# Patient Record
Sex: FEMALE | Race: WHITE | URBAN - METROPOLITAN AREA
[De-identification: names, ages, dates, MRNs, and addresses within clinical notes are randomized per-mention and may not be internally consistent; named-entity substitution may affect disease eponyms.]

---

## 2021-05-04 ENCOUNTER — NEW PATIENT ROUTINE/VISION (OUTPATIENT)
Dept: URBAN - METROPOLITAN AREA CLINIC 49 | Facility: CLINIC | Age: 51
End: 2021-05-04

## 2021-05-04 DIAGNOSIS — H52.4: ICD-10-CM

## 2021-05-04 DIAGNOSIS — H35.361: ICD-10-CM

## 2021-05-04 DIAGNOSIS — H25.13: ICD-10-CM

## 2021-05-04 DIAGNOSIS — Z01.00: ICD-10-CM

## 2021-05-04 PROCEDURE — 92015 DETERMINE REFRACTIVE STATE: CPT

## 2021-05-04 PROCEDURE — 92004 COMPRE OPH EXAM NEW PT 1/>: CPT

## 2021-05-04 ASSESSMENT — TONOMETRY
OD_IOP_MMHG: 13
OS_IOP_MMHG: 14

## 2021-05-04 ASSESSMENT — VISUAL ACUITY
OU_CC: J1+
OD_GLARE: 20/80
OS_CC: 20/30
OD_CC: 20/20
OD_GLARE: 20/50
OS_GLARE: >20/400

## 2021-05-04 ASSESSMENT — KERATOMETRY
OS_AXISANGLE2_DEGREES: 160
OD_AXISANGLE2_DEGREES: 120
OS_K1POWER_DIOPTERS: 47.50
OD_K2POWER_DIOPTERS: 47.00
OD_AXISANGLE_DEGREES: 030
OS_K2POWER_DIOPTERS: 47.25
OD_K1POWER_DIOPTERS: 47.50
OS_AXISANGLE_DEGREES: 070

## 2021-05-04 NOTE — PATIENT DISCUSSION
Patient given Rx for glasses. Will schedule New patient contact lens fitting next available with Dr. Austin Diss.

## 2021-05-04 NOTE — PATIENT DISCUSSION
IOP in normal range. Due to increase C/D ratio will order HVF, OCT (RNFL), and pachy. Will call patient with test results. Will also get medical records from patient previous doctor. Will continue to monitor.

## 2021-05-25 ENCOUNTER — DIAGNOSTICS ONLY (OUTPATIENT)
Dept: URBAN - METROPOLITAN AREA CLINIC 49 | Facility: CLINIC | Age: 51
End: 2021-05-25

## 2021-05-25 ENCOUNTER — CONTACT LENS FITTING (OUTPATIENT)
Dept: URBAN - METROPOLITAN AREA CLINIC 49 | Facility: CLINIC | Age: 51
End: 2021-05-25

## 2021-05-25 DIAGNOSIS — H52.4: ICD-10-CM

## 2021-05-25 DIAGNOSIS — H40.013: ICD-10-CM

## 2021-05-25 PROCEDURE — CLF EW SOF CL FIT, EW, SOFT, TORIC

## 2021-05-25 PROCEDURE — 92083 EXTENDED VISUAL FIELD XM: CPT

## 2021-05-25 PROCEDURE — 92133 CPTRZD OPH DX IMG PST SGM ON: CPT

## 2021-05-25 PROCEDURE — 76514 ECHO EXAM OF EYE THICKNESS: CPT

## 2021-05-25 ASSESSMENT — KERATOMETRY
OS_K2POWER_DIOPTERS: 47.25
OS_AXISANGLE_DEGREES: 070
OS_K1POWER_DIOPTERS: 47.50
OD_K1POWER_DIOPTERS: 47.50
OS_AXISANGLE_DEGREES: 070
OS_AXISANGLE2_DEGREES: 160
OD_AXISANGLE2_DEGREES: 120
OD_K1POWER_DIOPTERS: 47.50
OD_K2POWER_DIOPTERS: 47.00
OS_K1POWER_DIOPTERS: 47.50
OD_AXISANGLE2_DEGREES: 120
OS_K2POWER_DIOPTERS: 47.25
OS_AXISANGLE2_DEGREES: 160
OD_AXISANGLE_DEGREES: 030
OD_K2POWER_DIOPTERS: 47.00
OD_AXISANGLE_DEGREES: 030

## 2021-05-25 ASSESSMENT — VISUAL ACUITY
OU_CC: 20/20
OU_CC: J16
OS_CC: 20/30+2
OD_CC: 20/15-1

## 2021-05-25 NOTE — PATIENT DISCUSSION
Patient given Rx for glasses. Will schedule New patient contact lens fitting next available with Dr. Cris Reese.

## 2021-05-25 NOTE — PATIENT DISCUSSION
IOP in normal range. HVF non-glaucomatous OU. OCT RNFL WNL OD, thin temp OS.  Will continue to monitor. Results reviewed with patient today during CL appointment by AR.

## 2021-05-25 NOTE — PATIENT DISCUSSION
Trial of contact lenses for patient given today monovision. Patient to give the office a call to finalize rx.